# Patient Record
Sex: FEMALE | Race: WHITE | NOT HISPANIC OR LATINO | Employment: OTHER | URBAN - METROPOLITAN AREA
[De-identification: names, ages, dates, MRNs, and addresses within clinical notes are randomized per-mention and may not be internally consistent; named-entity substitution may affect disease eponyms.]

---

## 2017-12-15 DIAGNOSIS — Z79.890 POST-MENOPAUSE ON HRT (HORMONE REPLACEMENT THERAPY): ICD-10-CM

## 2017-12-15 RX ORDER — ESTRADIOL 0.05 MG/D
FILM, EXTENDED RELEASE TRANSDERMAL
Qty: 8 PATCH | Refills: 0 | Status: SHIPPED | OUTPATIENT
Start: 2017-12-15 | End: 2017-12-27 | Stop reason: SDUPTHER

## 2017-12-27 ENCOUNTER — OFFICE VISIT (OUTPATIENT)
Dept: OBSTETRICS AND GYNECOLOGY | Facility: CLINIC | Age: 57
End: 2017-12-27

## 2017-12-27 VITALS
HEIGHT: 67 IN | DIASTOLIC BLOOD PRESSURE: 66 MMHG | SYSTOLIC BLOOD PRESSURE: 118 MMHG | BODY MASS INDEX: 22.88 KG/M2 | WEIGHT: 145.8 LBS

## 2017-12-27 DIAGNOSIS — Z01.419 ENCOUNTER FOR GYNECOLOGICAL EXAMINATION WITHOUT ABNORMAL FINDING: ICD-10-CM

## 2017-12-27 DIAGNOSIS — N60.12 FIBROCYSTIC BREAST CHANGES, BILATERAL: ICD-10-CM

## 2017-12-27 DIAGNOSIS — Z79.890 POST-MENOPAUSE ON HRT (HORMONE REPLACEMENT THERAPY): Primary | ICD-10-CM

## 2017-12-27 DIAGNOSIS — N60.11 FIBROCYSTIC BREAST CHANGES, BILATERAL: ICD-10-CM

## 2017-12-27 PROCEDURE — 99396 PREV VISIT EST AGE 40-64: CPT | Performed by: OBSTETRICS & GYNECOLOGY

## 2017-12-27 RX ORDER — ESTRADIOL 0.05 MG/D
1 FILM, EXTENDED RELEASE TRANSDERMAL 2 TIMES WEEKLY
Qty: 24 PATCH | Refills: 3 | Status: SHIPPED | OUTPATIENT
Start: 2017-12-28 | End: 2018-12-09 | Stop reason: SDUPTHER

## 2017-12-27 NOTE — PROGRESS NOTES
Chief Complaint   Patient presents with   • Gynecologic Exam   • Med Refill       Katelynn Larsen is a 57 y.o. year old  presenting to be seen for her annual exam.This patient underwent a robotically--assisted TLH/BSO/VCS in 2011 for uterine adenomyosis and an intramural uterine leiomyoma which had caused menorrhagia.  Currently uses estradiol patches, 0.05 mg every 3-1/2 days and is asymptomatic.  She has chronic constipation.  Breast imaging has been benign.    SCREENING TESTS    Year 2012   Age                         PAP     Neg.                    HPV high risk                         Mammogram     benign benign                   KARENA score                         Breast MRI                         Lipids                         Vitamin D                         Colonoscopy                         DEXA  Frax (hip/any)                         Ovarian Screen                             She exercises regularly: yes.  She wears her seat belt: yes.  She has concerns about domestic violence: no.  She has noticed changes in height: no    GYN screening history:  · Last mammogram: was done on approximately 2017 and the result was: Birads II (Benign findings)..    No Additional Complaints Reported    The following portions of the patient's history were reviewed and updated as appropriate:vital signs and   She  does not have any pertinent problems on file.  She  has a past surgical history that includes Gammete intrafallopian tube tranfer; Bunionectomy; Cholecystectomy; total laparoscopic hysterectomy salpingo oophorectomy (Bilateral); [place] tooth extraction; Tonsillectomy; and Gum surgery.  Her family history is not on file.  She  reports that she has never smoked. She has never used smokeless tobacco. She reports that she does not drink alcohol or use illicit drugs.  Current Outpatient Prescriptions  "  Medication Sig Dispense Refill   • azelastine (ASTELIN) 0.1 % nasal spray 1 spray into each nostril 2 (Two) Times a Day.     • Calcium Citrate-Vitamin D (CALCIUM + D PO) Take 1 tablet by mouth Daily.     • Calcium Polycarbophil (KONSYL FIBER PO) Take 3 teaspoon(s) by mouth Daily.     • cetirizine (zyrTEC) 10 MG tablet Take 10 mg by mouth Daily.     • [START ON 12/28/2017] estradiol (MINIVELLE, VIVELLE-DOT) 0.05 MG/24HR patch Place 1 patch on the skin 2 (Two) Times a Week. 24 patch 3   • lisinopril (PRINIVIL,ZESTRIL) 2.5 MG tablet Take 1 tablet by mouth Daily.     • Multiple Vitamins-Minerals (MULTIVITAMIN ADULT PO) Take 1 tablet by mouth 2 (Two) Times a Week.     • omeprazole (priLOSEC) 20 MG capsule Take 20 mg by mouth Daily.     • polyethylene glycol (MIRALAX) packet Take 17 g by mouth Daily.     • Probiotic Product (PROBIOTIC COLON SUPPORT PO) Take 1 tablet by mouth Daily.       No current facility-administered medications for this visit.      She is allergic to morphine..    Review of Systems  A comprehensive review of systems was taken.  Constitutional: negative for fever, chills, activity change, appetite change, fatigue and unexpected weight change.  Respiratory: negative  Cardiovascular: negative  Gastrointestinal: positive for constipation  Genitourinary:negative  Musculoskeletal:negative  Behavioral/Psych: negative       /66  Ht 170.2 cm (67\")  Wt 66.1 kg (145 lb 12.8 oz)  LMP  (LMP Unknown)  BMI 22.84 kg/m2    Physical Exam    General:  alert; cooperative; well developed; well nourished   Skin:  No suspicious lesions seen   Thyroid: normal to inspection and palpation   Lungs:  clear to auscultation bilaterally   Heart:  regular rate and rhythm, S1, S2 normal, no murmur, click, rub or gallop   Breasts:  Examined in supine position  Symmetric without masses or skin dimpling  Nipples normal without inversion, lesions or discharge  There are no palpable axillary nodes  Fibrocystic changes are " present both breasts without a discrete mass   Abdomen: soft, non-tender; no masses  no umbilical or inginual hernias are present  no hepato-splenomegaly   Pelvis: Clinical staff was present for exam  External genitalia:  normal appearance of the external genitalia including Bartholin's and [place]'s glands.  Vaginal:  normal pink mucosa without prolapse or lesions.  Cervix:  absent.  Uterus:  absent.  Adnexa:  absent, bilateral.  Rectal:  anus visually normal appearing. recto-vaginal exam unremarkable and confirms findings;     Lab Review   No data reviewed    Imaging  Mammogram results- benign         ASSESSMENT  Problems Addressed this Visit        Genitourinary    Post-menopause on HRT (hormone replacement therapy) - Primary    Relevant Medications    estradiol (MINIVELLE, VIVELLE-DOT) 0.05 MG/24HR patch (Start on 12/28/2017)       Other    Fibrocystic breast changes, bilateral      Other Visit Diagnoses     Encounter for gynecological examination without abnormal finding              PLAN    Medications prescribed this encounter:    New Medications Ordered This Visit   Medications   • estradiol (MINIVELLE, VIVELLE-DOT) 0.05 MG/24HR patch     Sig: Place 1 patch on the skin 2 (Two) Times a Week.     Dispense:  24 patch     Refill:  3   · Monthly self breast assessment and annual breast imaging  · Calcium, 600 mg/ Vit. D, 400 IU daily; regular weight-bearing exercise  · Follow up: 12 month(s)  *Please note that portions of this documentation may have been completed with a voice recognition program.  Efforts were made to edit this dictation, but occasional words may have been mistranscribed.       This note was electronically signed.    MARGARITA Sahu MD  December 27, 2017  10:53 AM

## 2018-12-09 DIAGNOSIS — Z79.890 POST-MENOPAUSE ON HRT (HORMONE REPLACEMENT THERAPY): ICD-10-CM

## 2018-12-10 RX ORDER — ESTRADIOL 0.05 MG/D
1 FILM, EXTENDED RELEASE TRANSDERMAL 2 TIMES WEEKLY
Qty: 24 PATCH | Refills: 0 | Status: SHIPPED | OUTPATIENT
Start: 2018-12-10 | End: 2019-02-07 | Stop reason: SDUPTHER

## 2019-02-07 ENCOUNTER — OFFICE VISIT (OUTPATIENT)
Dept: OBSTETRICS AND GYNECOLOGY | Facility: CLINIC | Age: 59
End: 2019-02-07

## 2019-02-07 VITALS — SYSTOLIC BLOOD PRESSURE: 110 MMHG | BODY MASS INDEX: 22.55 KG/M2 | DIASTOLIC BLOOD PRESSURE: 60 MMHG | WEIGHT: 144 LBS

## 2019-02-07 DIAGNOSIS — N60.11 FIBROCYSTIC BREAST CHANGES, BILATERAL: Primary | ICD-10-CM

## 2019-02-07 DIAGNOSIS — Z01.419 ENCOUNTER FOR GYNECOLOGICAL EXAMINATION WITHOUT ABNORMAL FINDING: ICD-10-CM

## 2019-02-07 DIAGNOSIS — Z79.890 POST-MENOPAUSE ON HRT (HORMONE REPLACEMENT THERAPY): ICD-10-CM

## 2019-02-07 DIAGNOSIS — N60.12 FIBROCYSTIC BREAST CHANGES, BILATERAL: Primary | ICD-10-CM

## 2019-02-07 PROCEDURE — 99396 PREV VISIT EST AGE 40-64: CPT | Performed by: OBSTETRICS & GYNECOLOGY

## 2019-02-07 RX ORDER — OMEPRAZOLE 20 MG/1
CAPSULE, DELAYED RELEASE ORAL
COMMUNITY
End: 2021-07-20

## 2019-02-07 RX ORDER — ESTRADIOL 0.05 MG/D
1 FILM, EXTENDED RELEASE TRANSDERMAL 2 TIMES WEEKLY
Qty: 8 PATCH | Refills: 11 | Status: SHIPPED | OUTPATIENT
Start: 2019-02-07 | End: 2020-02-07

## 2019-02-07 RX ORDER — LEVOCETIRIZINE DIHYDROCHLORIDE 5 MG/1
TABLET, FILM COATED ORAL
COMMUNITY

## 2019-02-07 RX ORDER — INFLUENZA A VIRUS A/SINGAPORE/GP1908/2015 IVR-180A (H1N1) ANTIGEN (PROPIOLACTONE INACTIVATED), INFLUENZA A VIRUS A/SINGAPORE/INFIMH-16-0019/2016 IVR-186 (H3N2) ANTIGEN (PROPIOLACTONE INACTIVATED), INFLUENZA B VIRUS B/MARYLAND/15/2016 ANTIGEN (PROPIOLACTONE INACTIVATED), AND INFLUENZA B VIRUS B/PHUKET/3073/2013 BVR-1B ANTIGEN (PROPIOLACTONE INACTIVATED) 15; 15; 15; 15 UG/.5ML; UG/.5ML; UG/.5ML; UG/.5ML
INJECTION, SUSPENSION INTRAMUSCULAR
Refills: 0 | COMMUNITY
Start: 2018-11-05 | End: 2020-02-12

## 2019-02-07 RX ORDER — HEPATITIS A VACCINE 1440 [IU]/ML
INJECTION, SUSPENSION INTRAMUSCULAR
Refills: 0 | COMMUNITY
Start: 2018-11-05 | End: 2022-04-18

## 2019-02-07 RX ORDER — LACTOBACILLUS COMBINATION NO.4 3B CELL
CAPSULE ORAL
Refills: 12 | COMMUNITY
Start: 2018-12-21 | End: 2020-02-12

## 2019-02-07 RX ORDER — ZOSTER VACCINE RECOMBINANT, ADJUVANTED 50 MCG/0.5
KIT INTRAMUSCULAR
Refills: 0 | COMMUNITY
Start: 2018-11-27 | End: 2022-04-18

## 2019-02-07 NOTE — PROGRESS NOTES
Chief Complaint   Patient presents with   • Gynecologic Exam     denies c/os       Katelynn Larsen is a 58 y.o. year old  presenting to be seen for her annual exam.  This patient has previously had a robotically-assisted TLH/BSO/VCS.  She has had a cholecystectomy.  She currently uses estradiol patches, 0.05 mg every 3-1/2 days and is asymptomatic.  She has no side effects on estrogen replacement therapy.  She denies bowel or urinary symptoms.    SCREENING TESTS    Year 2012   Age                         PAP                         HPV high risk                         Mammogram      benign benign                  KARENA score                         Breast MRI                         Lipids                         Vitamin D                         Colonoscopy                         DEXA  Frax (hip/any)                         Ovarian Screen                             She exercises regularly: yes.  She wears her seat belt: yes.  She has concerns about domestic violence: no.  She has noticed changes in height: no    GYN screening history:  · Last mammogram: was done on approximately 2018 and the result was: Birads II (Benign findings)..    No Additional Complaints Reported    The following portions of the patient's history were reviewed and updated as appropriate:vital signs and   She  has a past medical history of Bowden's esophagus, Fibrocystic breast changes, bilateral, GERD (gastroesophageal reflux disease), Hypertension, IBS (irritable bowel syndrome), Post-menopause on HRT (hormone replacement therapy), and Seasonal allergies.  She does not have any pertinent problems on file.  She  has a past surgical history that includes Gammete intrafallopian tube tranfer; Bunionectomy; Cholecystectomy; total laparoscopic hysterectomy salpingo oophorectomy (Bilateral); [place] tooth extraction; Tonsillectomy;  and Gum surgery.  Her family history is not on file.  She  reports that  has never smoked. she has never used smokeless tobacco. She reports that she does not drink alcohol or use drugs.  Current Outpatient Medications   Medication Sig Dispense Refill   • AFLURIA QUADRIVALENT 0.5 ML suspension prefilled syringe injection inject 0.5 milliliter intramuscularly  0   • azelastine (ASTELIN) 0.1 % nasal spray 1 spray into each nostril 2 (Two) Times a Day.     • Calcium Carbonate-Vitamin D (CALCIUM 500/D PO) Calcium 500 + D     • Calcium Polycarbophil (KONSYL FIBER PO) Konsyl Fiber     • [employer] FLUTICASONE PROPIONATE 50 MCG/ACT nasal spray INSTILL 2 SPRAYS INTO EACH NOSTRIL EVERY DAY  12   • estradiol (MINIVELLE, VIVELLE-DOT) 0.05 MG/24HR patch Place 1 patch on the skin as directed by provider 2 (Two) Times a Week. 8 patch 11   • HAVRIX 1440 EL U/ML vaccine inject 1 milliliter intramuscularly  0   • levocetirizine (XYZAL) 5 MG tablet Xyzal 5 mg tablet   Take 1 tablet every day by oral route.     • lisinopril (PRINIVIL,ZESTRIL) 2.5 MG tablet Take 1 tablet by mouth Daily.     • MULTIPLE VITAMIN PO Multiple Vitamin capsule   Daily     • Multiple Vitamins-Minerals (MULTIVITAMIN ADULT PO) Take 1 tablet by mouth 2 (Two) Times a Week.     • omeprazole (priLOSEC) 20 MG capsule omeprazole 20 mg capsule,delayed release     • Polyethylene Glycol 3350 (MIRALAX PO) Miralax   Daily     • Probiotic Product (PROBIOTIC-10 PO) Probiotic     • SHINGRIX 50 MCG/0.5ML reconstituted suspension TO BE ADMINISTERED BY PHARMACIST FOR IMMUNIZATION  0     No current facility-administered medications for this visit.      She is allergic to morphine..    Review of Systems  A comprehensive review of systems was taken.  Constitutional: negative for fever, chills, activity change, appetite change, fatigue and unexpected weight change.  Respiratory: negative  Cardiovascular: negative  Gastrointestinal:  negative  Genitourinary:negative  Musculoskeletal:negative  Behavioral/Psych: negative       /60 (Patient Position: Sitting)   Wt 65.3 kg (144 lb)   LMP  (LMP Unknown)   BMI 22.55 kg/m²     Physical Exam    General:  alert; cooperative; well developed; well nourished   Skin:  No suspicious lesions seen   Thyroid: normal to inspection and palpation   Lungs:  clear to auscultation bilaterally   Heart:  regular rate and rhythm, S1, S2 normal, no murmur, click, rub or gallop   Breasts:  Examined in supine position  Symmetric without masses or skin dimpling  Nipples normal without inversion, lesions or discharge  There are no palpable axillary nodes  Fibrocystic changes are present both breasts without a discrete mass   Abdomen: soft, non-tender; no masses  no umbilical or inguinal hernias are present  no hepato-splenomegaly   Pelvis: Clinical staff was present for exam  External genitalia:  normal appearance of the external genitalia including Bartholin's and [place]'s glands.  Vaginal:  normal pink mucosa without prolapse or lesions.  Cervix:  absent.  Uterus:  absent.  Adnexa:  absent, bilateral.  Rectal:  anus visually normal appearing. recto-vaginal exam unremarkable and confirms findings;     Lab Review   No data reviewed    Imaging  Mammogram results- Birads II         ASSESSMENT  Problems Addressed this Visit        Genitourinary    Post-menopause on HRT (hormone replacement therapy)    Relevant Medications    estradiol (MINIVELLE, VIVELLE-DOT) 0.05 MG/24HR patch       Other    Fibrocystic breast changes, bilateral - Primary      Other Visit Diagnoses     Encounter for gynecological examination without abnormal finding              PLAN    Medications prescribed this encounter:    New Medications Ordered This Visit   Medications   • estradiol (MINIVELLE, VIVELLE-DOT) 0.05 MG/24HR patch     Sig: Place 1 patch on the skin as directed by provider 2 (Two) Times a Week.     Dispense:  8 patch     Refill:  11    · Monthly self breast assessment and annual breast imaging  · Calcium, 600 mg/ Vit. D, 400 IU daily; regular weight-bearing exercise  · Follow up: 12 month(s)  *Please note that portions of this documentation may have been completed with a voice recognition program.  Efforts were made to edit this dictation, but occasional words may have been mistranscribed.       This note was electronically signed.    MARGARITA Sahu MD  February 7, 2019  4:44 PM

## 2020-02-12 ENCOUNTER — OFFICE VISIT (OUTPATIENT)
Dept: OBSTETRICS AND GYNECOLOGY | Facility: CLINIC | Age: 60
End: 2020-02-12

## 2020-02-12 VITALS
DIASTOLIC BLOOD PRESSURE: 88 MMHG | SYSTOLIC BLOOD PRESSURE: 150 MMHG | BODY MASS INDEX: 23.17 KG/M2 | WEIGHT: 147.6 LBS | HEIGHT: 67 IN

## 2020-02-12 DIAGNOSIS — N60.12 FIBROCYSTIC BREAST CHANGES, BILATERAL: ICD-10-CM

## 2020-02-12 DIAGNOSIS — Z79.890 POST-MENOPAUSE ON HRT (HORMONE REPLACEMENT THERAPY): Primary | ICD-10-CM

## 2020-02-12 DIAGNOSIS — Z01.419 ENCOUNTER FOR GYNECOLOGICAL EXAMINATION WITHOUT ABNORMAL FINDING: ICD-10-CM

## 2020-02-12 DIAGNOSIS — N60.11 FIBROCYSTIC BREAST CHANGES, BILATERAL: ICD-10-CM

## 2020-02-12 PROCEDURE — 99396 PREV VISIT EST AGE 40-64: CPT | Performed by: OBSTETRICS & GYNECOLOGY

## 2020-02-12 RX ORDER — FLUTICASONE PROPIONATE 50 MCG
SPRAY, SUSPENSION (ML) NASAL
COMMUNITY

## 2020-02-12 RX ORDER — ESTRADIOL 0.05 MG/D
1 FILM, EXTENDED RELEASE TRANSDERMAL 2 TIMES WEEKLY
COMMUNITY
End: 2020-02-12 | Stop reason: SDUPTHER

## 2020-02-12 RX ORDER — ESTRADIOL 0.05 MG/D
1 FILM, EXTENDED RELEASE TRANSDERMAL 2 TIMES WEEKLY
Qty: 24 PATCH | Refills: 3 | Status: SHIPPED | OUTPATIENT
Start: 2020-02-13 | End: 2020-07-06 | Stop reason: ALTCHOICE

## 2020-02-12 NOTE — PROGRESS NOTES
Chief Complaint   Patient presents with   • Gynecologic Exam   • Med Refill       Katelynn Larsen is a 59 y.o. year old  presenting to be seen for her annual exam.  This patient has previously had a robotically-assisted TLH/BSO/VCS for uterine adenomyosis.  She currently uses estradiol patches, 0.05 mg every 3-1/2 days applied topically.  She has occasional hot flashes and sweats.  She does not desire to decrease her estrogen dose.  She denies bowel or urinary symptoms.  She has no side effects on estrogen replacement therapy.    SCREENING TESTS    Year 2012   Age                         PAP                         HPV high risk                         Mammogram       benign benign                 KARENA score                         Breast MRI                         Lipids                         Vitamin D                         Colonoscopy                         DEXA  Frax (hip/any)                         Ovarian Screen                             She exercises regularly: yes.  She wears her seat belt: yes.  She has concerns about domestic violence: no.  She has noticed changes in height: no    GYN screening history:  · Last mammogram: was done on approximately 2019 and the result was: Birads II (Benign findings)..    No Additional Complaints Reported    The following portions of the patient's history were reviewed and updated as appropriate:vital signs and   She  has a past medical history of Bowden's esophagus, Fibrocystic breast changes, bilateral, GERD (gastroesophageal reflux disease), Hypertension, IBS (irritable bowel syndrome), Post-menopause on HRT (hormone replacement therapy), and Seasonal allergies.  She does not have any pertinent problems on file.  She  has a past surgical history that includes Gammete intrafallopian tube tranfer; Bunionectomy; Cholecystectomy; total laparoscopic  hysterectomy salpingo oophorectomy (Bilateral); [place] tooth extraction; Tonsillectomy; and Gum surgery.  Her family history is not on file.  She  reports that she has never smoked. She has never used smokeless tobacco. She reports that she does not drink alcohol or use drugs.  Current Outpatient Medications   Medication Sig Dispense Refill   • azelastine (ASTELIN) 0.1 % nasal spray 1 spray into each nostril 2 (Two) Times a Day.     • Calcium Carbonate-Vitamin D (CALCIUM 500/D PO) Calcium 500 + D     • Calcium Polycarbophil (KONSYL FIBER PO) Konsyl Fiber     • [START ON 2/13/2020] estradiol (MINIVELLE, VIVELLE-DOT) 0.05 MG/24HR patch Place 1 patch on the skin as directed by provider 2 (Two) Times a Week. 24 patch 3   • fluticasone (FLONASE) 50 MCG/ACT nasal spray fluticasone propionate 50 mcg/actuation nasal spray,suspension   Spray 2 sprays every day by intranasal route.     • HAVRIX 1440 EL U/ML vaccine inject 1 milliliter intramuscularly  0   • levocetirizine (XYZAL) 5 MG tablet Xyzal 5 mg tablet   Take 1 tablet every day by oral route.     • lisinopril (PRINIVIL,ZESTRIL) 2.5 MG tablet Take 1 tablet by mouth Daily.     • Multiple Vitamins-Minerals (MULTIVITAMIN ADULT PO) Take 1 tablet by mouth 2 (Two) Times a Week.     • omeprazole (priLOSEC) 20 MG capsule omeprazole 20 mg capsule,delayed release     • Polyethylene Glycol 3350 (MIRALAX PO) Miralax   Daily     • Probiotic Product (PROBIOTIC-10 PO) Probiotic     • SHINGRIX 50 MCG/0.5ML reconstituted suspension TO BE ADMINISTERED BY PHARMACIST FOR IMMUNIZATION  0     No current facility-administered medications for this visit.      She is allergic to morphine..    Review of Systems  A comprehensive review of systems was taken.  Constitutional: negative for fever, chills, activity change, appetite change, fatigue and unexpected weight change.  Respiratory: negative  Cardiovascular: negative  Gastrointestinal:  "negative  Genitourinary:negative  Musculoskeletal:negative  Behavioral/Psych: negative       /88   Ht 170.2 cm (67\")   Wt 67 kg (147 lb 9.6 oz)   LMP  (LMP Unknown)   Breastfeeding No   BMI 23.12 kg/m²     Physical Exam    General:  alert; cooperative; well developed; well nourished   Skin:  No suspicious lesions seen   Thyroid: normal to inspection and palpation   Lungs:  clear to auscultation bilaterally   Heart:  regular rate and rhythm, S1, S2 normal, no murmur, click, rub or gallop   Breasts:  Examined in supine position  Symmetric without masses or skin dimpling  Nipples normal without inversion, lesions or discharge  There are no palpable axillary nodes  Fibrocystic changes are present both breasts without a discrete mass   Abdomen: soft, non-tender; no masses  no umbilical or inguinal hernias are present  no hepato-splenomegaly   Pelvis: Clinical staff was present for exam  External genitalia:  normal appearance of the external genitalia including Bartholin's and [place]'s glands.  Vaginal:  normal pink mucosa without prolapse or lesions. the urethro-vesical angle is good  Cervix:  absent.  Uterus:  absent.  Adnexa:  absent, bilateral.  Rectal:  anus visually normal appearing. recto-vaginal exam unremarkable and confirms findings;     Lab Review   No data reviewed    Imaging  Mammogram results         ASSESSMENT  Problems Addressed this Visit        Genitourinary    Post-menopause on HRT (hormone replacement therapy) - Primary    Relevant Medications    estradiol (MINIVELLE, VIVELLE-DOT) 0.05 MG/24HR patch (Start on 2/13/2020)       Other    Fibrocystic breast changes, bilateral      Other Visit Diagnoses     Encounter for gynecological examination without abnormal finding                  Substance History:   reports that she has never smoked. She has never used smokeless tobacco.   reports that she does not drink alcohol.   reports that she does not use drugs.    Substance use counseling is not " indicated based on patient history.      PLAN    Medications prescribed this encounter:    New Medications Ordered This Visit   Medications   • estradiol (MINIVELLE, VIVELLE-DOT) 0.05 MG/24HR patch     Sig: Place 1 patch on the skin as directed by provider 2 (Two) Times a Week.     Dispense:  24 patch     Refill:  3   · Monthly self breast assessment and annual breast imaging  · I have discussed reducing the dose of estrogen replacement therapy with the patient, however she does not desire to do so at present.  · Calcium, 600 mg/ Vit. D, 400 IU daily; regular weight-bearing exercise  · Follow up: 12 month(s)  *Please note that portions of this documentation may have been completed with a voice recognition program.  Efforts were made to edit this dictation, but occasional words may have been mistranscribed.       This note was electronically signed.    MARGARITA Sahu MD  February 12, 2020  4:28 PM

## 2020-07-02 ENCOUNTER — TELEPHONE (OUTPATIENT)
Dept: OBSTETRICS AND GYNECOLOGY | Facility: CLINIC | Age: 60
End: 2020-07-02

## 2020-07-02 NOTE — TELEPHONE ENCOUNTER
Patient is menopausal and uses estradiol patches. 0.05 mg every 3 1/2 days.  She states that she has good control of symptoms during the day, but is having difficulty sleeping and hot flashes/night sweats at night.  Wonders if she would have better symptom control with oral estrogen?  Also has Raynaud's and has spoken with her physician regarding this problem.  The PCP recommended that she drink soy milk in the evenings, but this has not helped.    Please advise.

## 2020-07-06 RX ORDER — ESTRADIOL 1 MG/1
1 TABLET ORAL DAILY
Qty: 30 TABLET | Refills: 7 | Status: SHIPPED | OUTPATIENT
Start: 2020-07-06 | End: 2020-08-03 | Stop reason: SDUPTHER

## 2020-07-06 NOTE — TELEPHONE ENCOUNTER
Per Dr. Sahu we will change the patient to oral estradiol 1 mg and the prescription will be sent to her pharmacy.    When speaking with the patient about melatonin, she states that she takes 2.5 mg of lisinopril daily for hypertension.  She states that she has read that there may be some sort of contraindication to taking melatonin with lisinopril.  I have advised her to contact her PCP regarding use of melatonin with lisinopril.  Annual exam 2/16/2021.

## 2020-08-03 ENCOUNTER — TELEPHONE (OUTPATIENT)
Dept: OBSTETRICS AND GYNECOLOGY | Facility: CLINIC | Age: 60
End: 2020-08-03

## 2020-08-03 RX ORDER — ESTRADIOL 1 MG/1
1 TABLET ORAL DAILY
Qty: 90 TABLET | Refills: 2 | Status: SHIPPED | OUTPATIENT
Start: 2020-08-03 | End: 2021-07-20

## 2020-08-03 NOTE — TELEPHONE ENCOUNTER
The patient's pharmacy has sent a fax request for a 90 day supply of estradiol 1 mg.  The patient had been getting a 30 day supply at a time.  Next appointment with Dr. Sahu 2/16/2021.

## 2021-07-20 ENCOUNTER — OFFICE VISIT (OUTPATIENT)
Dept: OBSTETRICS AND GYNECOLOGY | Facility: CLINIC | Age: 61
End: 2021-07-20

## 2021-07-20 VITALS
BODY MASS INDEX: 22.19 KG/M2 | WEIGHT: 141.4 LBS | HEIGHT: 67 IN | DIASTOLIC BLOOD PRESSURE: 66 MMHG | SYSTOLIC BLOOD PRESSURE: 110 MMHG

## 2021-07-20 DIAGNOSIS — Z85.3 PERSONAL HISTORY OF BREAST CANCER: ICD-10-CM

## 2021-07-20 DIAGNOSIS — Z01.419 ENCOUNTER FOR GYNECOLOGICAL EXAMINATION WITHOUT ABNORMAL FINDING: Primary | ICD-10-CM

## 2021-07-20 DIAGNOSIS — Z78.0 MENOPAUSE: ICD-10-CM

## 2021-07-20 DIAGNOSIS — Z01.419 ENCOUNTER FOR ANNUAL ROUTINE GYNECOLOGICAL EXAMINATION: Primary | ICD-10-CM

## 2021-07-20 DIAGNOSIS — Z01.419 ENCOUNTER FOR ANNUAL ROUTINE GYNECOLOGICAL EXAMINATION: ICD-10-CM

## 2021-07-20 PROCEDURE — 99396 PREV VISIT EST AGE 40-64: CPT | Performed by: OBSTETRICS & GYNECOLOGY

## 2021-07-20 RX ORDER — CAPECITABINE 500 MG/1
6 TABLET, FILM COATED ORAL DAILY
COMMUNITY
Start: 2021-07-08 | End: 2022-04-18

## 2021-07-20 RX ORDER — SODIUM FLUORIDE 6.1 MG/ML
GEL, DENTIFRICE DENTAL
COMMUNITY
Start: 2021-06-30

## 2021-07-20 RX ORDER — PROMETHAZINE HYDROCHLORIDE 25 MG/1
25 TABLET ORAL AS NEEDED
COMMUNITY
End: 2022-04-18

## 2021-07-20 RX ORDER — ONDANSETRON HYDROCHLORIDE 8 MG/1
1 TABLET, FILM COATED ORAL 2 TIMES DAILY
COMMUNITY
Start: 2021-07-16 | End: 2022-04-18

## 2021-07-20 RX ORDER — LANOLIN ALCOHOL/MO/W.PET/CERES
1000 CREAM (GRAM) TOPICAL DAILY
COMMUNITY

## 2021-07-20 RX ORDER — FAMOTIDINE 20 MG/1
20 TABLET, FILM COATED ORAL 2 TIMES DAILY
COMMUNITY
End: 2022-04-18

## 2021-07-20 RX ORDER — PROCHLORPERAZINE MALEATE 10 MG
1 TABLET ORAL AS NEEDED
COMMUNITY
Start: 2021-07-09 | End: 2022-04-18

## 2021-07-20 NOTE — PROGRESS NOTES
Chief Complaint   Patient presents with   • Annual Exam     undergoing treatment for breast cancer       Katelynn Larsen is a 60 y.o. year old  presenting to be seen for her annual exam.  I have previously done a robotically-assisted total laparoscopic hysterectomy/bilateral salpingo-oophorectomy/vaginal vault suspension to uterosacral ligaments on this patient for extensive uterine adenomyosis and an intramural uterine leiomyoma.  She underwent a lumpectomy of the left breast in 2020 for invasive ductal carcinoma.  The lesion was ER/NM negative and the patient is currently on chemotherapy.  She has had hair loss.  She has had some nausea.  She denies bowel or urinary symptoms.    SCREENING TESTS    Year 2012   Age                         PAP                         HPV high risk                         Mammogram         Abnl                KARENA score                         Breast MRI                         Lipids                         Vitamin D                         Colonoscopy                         DEXA  Frax (hip/any)                         Ovarian Screen                             She exercises regularly: yes.  She wears her seat belt: yes.  She has concerns about domestic violence: no.  She has noticed changes in height: no    GYN screening history:  · Last mammogram: was done on approximately 2020 and the result was: Birads IV (Suspicous abnormality)..    No Additional Complaints Reported    The following portions of the patient's history were reviewed and updated as appropriate:vital signs and   She  has a past medical history of Bowden's esophagus, Breast cancer (CMS/HCC), Fibrocystic breast changes, bilateral, GERD (gastroesophageal reflux disease), Hypertension, IBS (irritable bowel syndrome), Menopause, and Seasonal allergies.  She does not have any pertinent problems on  file.  She  has a past surgical history that includes Gammete intrafallopian tube tranfer; Bunionectomy; Cholecystectomy; total laparoscopic hysterectomy salpingo oophorectomy (Bilateral); [place] tooth extraction; Tonsillectomy; Gum surgery; Breast biopsy; and breast lumpectomy with sentinel node biopsy (Left).  Her family history is not on file.  She  reports that she has never smoked. She has never used smokeless tobacco. She reports that she does not drink alcohol and does not use drugs.  Current Outpatient Medications   Medication Sig Dispense Refill   • famotidine (PEPCID) 20 MG tablet Take 20 mg by mouth 2 (Two) Times a Day.     • promethazine (PHENERGAN) 25 MG tablet Take 25 mg by mouth As Needed for Nausea or Vomiting.     • vitamin B-12 (CYANOCOBALAMIN) 1000 MCG tablet Take 1,000 mcg by mouth Daily.     • azelastine (ASTELIN) 0.1 % nasal spray 1 spray into each nostril 2 (Two) Times a Day.     • Calcium Carbonate-Vitamin D (CALCIUM 500/D PO) Calcium 500 + D     • Calcium Polycarbophil (KONSYL FIBER PO) Konsyl Fiber     • capecitabine (XELODA) 500 MG chemo tablet Take 6 tablets by mouth Daily.     • fluticasone (FLONASE) 50 MCG/ACT nasal spray fluticasone propionate 50 mcg/actuation nasal spray,suspension   Spray 2 sprays every day by intranasal route.     • HAVRIX 1440 EL U/ML vaccine inject 1 milliliter intramuscularly  0   • levocetirizine (XYZAL) 5 MG tablet Xyzal 5 mg tablet   Take 1 tablet every day by oral route.     • lisinopril (PRINIVIL,ZESTRIL) 2.5 MG tablet Take 1 tablet by mouth Daily.     • ondansetron (ZOFRAN) 8 MG tablet Take 1 tablet by mouth 2 (two) times a day.     • Polyethylene Glycol 3350 (MIRALAX PO) Miralax   Daily     • PreviDent 5000 Dry Mouth 1.1 % gel      • Probiotic Product (PROBIOTIC-10 PO) Probiotic     • prochlorperazine (COMPAZINE) 10 MG tablet Take 1 tablet by mouth As Needed.     • SHINGRIX 50 MCG/0.5ML reconstituted suspension TO BE ADMINISTERED BY PHARMACIST FOR  "IMMUNIZATION  0     No current facility-administered medications for this visit.     She is allergic to morphine..    Review of Systems  A review of systems was taken.  Constitutional: negative for fever, chills, activity change, appetite change, fatigue and unexpected weight change.  Respiratory: negative  Cardiovascular: negative  Gastrointestinal: positive for reflux symptoms  Genitourinary:negative  Musculoskeletal:negative  Behavioral/Psych: negative     Counseling/Anticipatory Guidance Discussed: nutrition, physical activity, healthy weight, immunizations, screenings and self-breast exam      /66   Ht 170.2 cm (67\")   Wt 64.1 kg (141 lb 6.4 oz)   LMP  (LMP Unknown)   Breastfeeding No   BMI 22.15 kg/m²     BMI reviewed     MEDICALLY INDICATED   Physical Exam    Neuro: alert and oriented to person, place and time    General:  alert; cooperative; well developed; well nourished   Skin:  No suspicious lesions seen   Thyroid: normal to inspection and palpation   Lungs:  breathing is unlabored  clear to auscultation bilaterally   Heart:  regular rate and rhythm, S1, S2 normal, no murmur, click, rub or gallop  normal apical impulse   Breasts:  Examined in supine position  Nipples normal without inversion, lesions or discharge  There are no palpable axillary nodes  Fibrocystic changes are present both breasts without a discrete mass  Scar left breast   Abdomen: soft, non-tender; no masses  no umbilical or inguinal hernias are present  no hepato-splenomegaly   Pelvis: Clinical staff was present for exam  External genitalia:  normal appearance of the external genitalia including Bartholin's and Idanha's glands.  Vaginal:  normal pink mucosa without prolapse or lesions. the urethro-vesical angle is good  Cervix:  absent.  Uterus:  absent.  Adnexa:  absent, bilateral.  Rectal:  anus visually normal appearing. recto-vaginal exam unremarkable and confirms findings;     Lab Review   CBC results and CMP " results    Imaging  Mammogram results              ASSESSMENT  Problems Addressed this Visit        Genitourinary and Reproductive     Menopause      Other Visit Diagnoses     Encounter for gynecological examination without abnormal finding    -  Primary    Personal history of breast cancer          Diagnoses       Codes Comments    Encounter for gynecological examination without abnormal finding    -  Primary ICD-10-CM: Z01.419  ICD-9-CM: V72.31     Menopause     ICD-10-CM: Z78.0  ICD-9-CM: 627.2     Personal history of breast cancer     ICD-10-CM: Z85.3  ICD-9-CM: V10.3               Substance History:   reports that she has never smoked. She has never used smokeless tobacco.   reports no history of alcohol use.   reports no history of drug use.    Substance use counseling is not indicated based on patient history.      PLAN    · Medications prescribed this encounter:  No orders of the defined types were placed in this encounter.  · She will continue with oncology follow-up for breast cancer including monthly self breast assessment.  MRI of the breast in November 2021  · Calcium, 600 mg/ Vit. D, 400 IU daily; regular weight-bearing exercise  · Follow up: 12 month(s)  *Please note that portions of this documentation may have been completed with a voice recognition program.  Efforts were made to edit this dictation, but occasional words may have been mistranscribed.       This note was electronically signed.    MARGARITA Sahu MD  July 20, 2021  10:35 EDT

## 2022-01-20 ENCOUNTER — AMBULATORY SURGICAL CENTER (OUTPATIENT)
Dept: URBAN - METROPOLITAN AREA SURGERY 10 | Facility: SURGERY | Age: 62
End: 2022-01-20
Payer: COMMERCIAL

## 2022-01-20 ENCOUNTER — OFFICE (OUTPATIENT)
Dept: URBAN - METROPOLITAN AREA PATHOLOGY 4 | Facility: PATHOLOGY | Age: 62
End: 2022-01-20

## 2022-01-20 DIAGNOSIS — K57.30 DIVERTICULOSIS OF LARGE INTESTINE WITHOUT PERFORATION OR ABS: ICD-10-CM

## 2022-01-20 DIAGNOSIS — K64.0 FIRST DEGREE HEMORRHOIDS: ICD-10-CM

## 2022-01-20 DIAGNOSIS — K29.70 GASTRITIS, UNSPECIFIED, WITHOUT BLEEDING: ICD-10-CM

## 2022-01-20 DIAGNOSIS — Z12.11 ENCOUNTER FOR SCREENING FOR MALIGNANT NEOPLASM OF COLON: ICD-10-CM

## 2022-01-20 DIAGNOSIS — K21.00 GASTRO-ESOPHAGEAL REFLUX DISEASE WITH ESOPHAGITIS, WITHOUT B: ICD-10-CM

## 2022-01-20 DIAGNOSIS — C50.919 MALIGNANT NEOPLASM OF UNSPECIFIED SITE OF UNSPECIFIED FEMALE: ICD-10-CM

## 2022-01-20 DIAGNOSIS — K44.9 DIAPHRAGMATIC HERNIA WITHOUT OBSTRUCTION OR GANGRENE: ICD-10-CM

## 2022-01-20 PROCEDURE — 45378 DIAGNOSTIC COLONOSCOPY: CPT | Mod: 33 | Performed by: INTERNAL MEDICINE

## 2022-01-20 PROCEDURE — 88305 TISSUE EXAM BY PATHOLOGIST: CPT | Performed by: INTERNAL MEDICINE

## 2022-01-20 PROCEDURE — 43239 EGD BIOPSY SINGLE/MULTIPLE: CPT | Performed by: INTERNAL MEDICINE

## 2022-01-21 PROBLEM — K22.70 BARRETT'S ESOPHAGUS: Status: ACTIVE | Noted: 2022-01-21

## 2022-08-01 ENCOUNTER — OFFICE VISIT (OUTPATIENT)
Dept: OBSTETRICS AND GYNECOLOGY | Facility: CLINIC | Age: 62
End: 2022-08-01

## 2022-08-01 VITALS
BODY MASS INDEX: 23.69 KG/M2 | RESPIRATION RATE: 16 BRPM | DIASTOLIC BLOOD PRESSURE: 70 MMHG | WEIGHT: 149 LBS | SYSTOLIC BLOOD PRESSURE: 118 MMHG

## 2022-08-01 DIAGNOSIS — Z01.411 ENCOUNTER FOR GYNECOLOGICAL EXAMINATION WITH ABNORMAL FINDING: Primary | ICD-10-CM

## 2022-08-01 DIAGNOSIS — N60.11 FIBROCYSTIC BREAST CHANGES, BILATERAL: ICD-10-CM

## 2022-08-01 DIAGNOSIS — Z85.3 PERSONAL HISTORY OF BREAST CANCER: ICD-10-CM

## 2022-08-01 DIAGNOSIS — N95.2 VAGINAL ATROPHY: ICD-10-CM

## 2022-08-01 DIAGNOSIS — N60.12 FIBROCYSTIC BREAST CHANGES, BILATERAL: ICD-10-CM

## 2022-08-01 PROCEDURE — 99396 PREV VISIT EST AGE 40-64: CPT | Performed by: NURSE PRACTITIONER

## 2022-08-01 NOTE — PROGRESS NOTES
Annual Visit     Patient Name: Katelynn Larsen  : 1960   MRN: [id number]   Care Team: Patient Care Team:  Katelynn Taylor MD as PCP - General  Ludivina Montes De Oca APRN as Nurse Practitioner (Nurse Practitioner)    Chief Complaint:    Chief Complaint   Patient presents with   • Annual Exam       HPI: Katelynn Larsen is a 61 y.o. year old  presenting to be seen for her gynecologic exam.   S/p total hyst with BSO d/t adenomyosis and uterine fibroids   Vaginal dryness is bothersome - uses lubricant with intercourse which is helpful   Dr. Sahu recommended Replens last yr but she hasn't used it consistently     Hx left breast invasive ductal carcinoma - s/p lumpectomy   ER and ND negative   Sees oncologist and radiologist q 3 months   Dx mammogram 2021 birads 2   Bilateral breast MRI done 2022     Pap smear 2021 WNL     Colonoscopy 2022 rpt 5 yrs   Positive genetic testing for gene that increases risk of colon cancer and ovarian cancer (she is s/p BSO)     Has been a pt of Dr. Sahu for many yrs       Subjective      /70   Resp 16   Wt 67.6 kg (149 lb)   LMP  (LMP Unknown)   Breastfeeding No   BMI 23.69 kg/m²     BMI reviewed: Body mass index is 23.69 kg/m².      Objective     Physical Exam    Neuro: alert and oriented to person, place and time   General:  alert; cooperative; well developed; well nourished   Skin:  No suspicious lesions seen   Thyroid: normal to inspection and palpation   Lungs:  breathing is unlabored  clear to auscultation bilaterally   Heart:  regular rate and rhythm, S1, S2 normal, no murmur, click, rub or gallop  normal apical impulse   Breasts:  Examined in supine position  Symmetric without masses or skin dimpling  Nipples normal without inversion, lesions or discharge  There are no palpable axillary nodes  Fibrocystic changes are present both breasts without a discrete mass  S/p lumpectomy left breast      Abdomen: soft, non-tender; no masses  no umbilical or  inguinal hernias are present  no hepato-splenomegaly   Pelvis: Clinical staff was present for exam  External genitalia:  normal appearance of the external genitalia including Bartholin's and Ravena's glands.  :  urethral meatus normal;  Vaginal:  atrophic mucosal changes are present;  Cervix:  absent.  Uterus:  absent.  Adnexa:  absent, bilateral.  Rectal:  digital rectal exam not performed; anus visually normal appearing.         Assessment / Plan      Assessment  Problems Addressed This Visit    ICD-10-CM ICD-9-CM   1. Encounter for gynecological examination with abnormal finding  Z01.411 V72.31   2. Vaginal atrophy  N95.2 627.3   3. Fibrocystic breast changes, bilateral  N60.11 610.1    N60.12    4. Personal history of breast cancer  Z85.3 V10.3       Plan    Discussed monthly SBEs and importance of mammogram and MRI   Discussed fibrocystic breast changes   Cont f/u with [facility]     Discussed Replens OTC vaginal insert 2-3x/wk at bedtime   Cont with lubricant prn with intercourse     AV 1 yr         40 to 64: Counseling/Anticipatory Guidance Discussed: screenings and self-breast exam    Follow Up  Return in about 1 year (around 8/1/2023) for Annual physical.  Patient was given instructions and counseling regarding her condition or for health maintenance advice. Please see specific information pulled into the AVS if appropriate.     Ludivina Montes De Oca, APRN  August 1, 2022  15:31 EDT

## 2023-07-10 ENCOUNTER — TELEPHONE (OUTPATIENT)
Dept: OBSTETRICS AND GYNECOLOGY | Facility: CLINIC | Age: 63
End: 2023-07-10

## 2023-07-10 NOTE — TELEPHONE ENCOUNTER
Caller: Katelynn Larsen    Relationship to patient: Self    Best call back number: [phone]    Patient is needing: PT LEFT FAX NUMBER FOR NEW PROVIDER PER LAST TE. FAX: [phone]

## 2023-07-10 NOTE — TELEPHONE ENCOUNTER
Caller: LarsenKatelynn    Relationship: Self    Best call back number: [phone]    What is the best time to reach you: ANYTIME    Who are you requesting to speak with (clinical staff, provider,  specific staff member): ANYONE    What was the call regarding: PT IS TRANSFERRING CARE TO [facility] (DR. HUDSON MARES) - SHE IS NEEDING TO HAVE HER MEDICAL RECORDS SENT TO THEM - PT IS NEEDING TO KNOW IF THERE IS A RELEASE FORM SHE NEEDS TO FILL OUT BEFORE HAND    PLEASE CALL THE PT TO DISCUSS    THANK YOU!

## 2024-11-30 PROCEDURE — 87186 SC STD MICRODIL/AGAR DIL: CPT | Performed by: NURSE PRACTITIONER

## 2024-11-30 PROCEDURE — 87086 URINE CULTURE/COLONY COUNT: CPT | Performed by: NURSE PRACTITIONER

## 2024-11-30 PROCEDURE — 87088 URINE BACTERIA CULTURE: CPT | Performed by: NURSE PRACTITIONER
